# Patient Record
Sex: FEMALE | URBAN - METROPOLITAN AREA
[De-identification: names, ages, dates, MRNs, and addresses within clinical notes are randomized per-mention and may not be internally consistent; named-entity substitution may affect disease eponyms.]

---

## 2022-01-14 ENCOUNTER — OFFICE VISIT (OUTPATIENT)
Dept: URBAN - METROPOLITAN AREA TELEHEALTH 2 | Facility: TELEHEALTH | Age: 47
End: 2022-01-14
Payer: COMMERCIAL

## 2022-01-14 DIAGNOSIS — K21.9 GERD WITHOUT ESOPHAGITIS: ICD-10-CM

## 2022-01-14 DIAGNOSIS — R10.32 LLQ ABDOMINAL PAIN: ICD-10-CM

## 2022-01-14 DIAGNOSIS — C50.111 MALIGNANT NEOPLASM OF CENTRAL PORTION OF RIGHT FEMALE BREAST: ICD-10-CM

## 2022-01-14 DIAGNOSIS — Z17.1 ESTROGEN RECEPTOR NEGATIVE STATUS [ER-]: ICD-10-CM

## 2022-01-14 PROCEDURE — 99214 OFFICE O/P EST MOD 30 MIN: CPT | Performed by: INTERNAL MEDICINE

## 2022-01-14 RX ORDER — DICYCLOMINE HYDROCHLORIDE 20 MG/1
1 TABLET TABLET ORAL THREE TIMES A DAY
Qty: 42 TABLET | Refills: 1 | OUTPATIENT
Start: 2022-01-14 | End: 2022-02-11

## 2022-01-14 NOTE — HPI-TODAY'S VISIT:
This is a telehealth OV to which patient has agreed to. Limitations of telehealth discussed. Patient's @ home during this virtual OV. 45 yo pt w intermittent LLQ abdominal pain. wo radiation, crmping - dull, tender to touch when it occurs, lasting 3 days; unknown triggers. No obstipation, fever, chills, nor urologic sxs associated w  her abdominal pain. She had COVID-19 3 weeks ago: arthralgias / myalgias / cough / headache / fatigue, the latter has been persistent since. Currently, no respiratory sxs and no fever or chills. She had  a recent normal GYN evaluation and normal Pelvic CT scan.  EGD 1/20: NERD, HH, chronic Hp-negative gastritis and normal duodenal bx's. Colonoscopy 1/20: normal. She will need to have surgical revision of breast implants.  She's fully COVID-19 vaccinated. No other complaints.

## 2022-05-25 ENCOUNTER — OFFICE VISIT (OUTPATIENT)
Dept: URBAN - METROPOLITAN AREA CLINIC 98 | Facility: CLINIC | Age: 47
End: 2022-05-25
Payer: COMMERCIAL

## 2022-05-25 VITALS
WEIGHT: 198 LBS | TEMPERATURE: 97.3 F | HEART RATE: 83 BPM | DIASTOLIC BLOOD PRESSURE: 65 MMHG | HEIGHT: 61 IN | SYSTOLIC BLOOD PRESSURE: 100 MMHG | BODY MASS INDEX: 37.38 KG/M2

## 2022-05-25 DIAGNOSIS — C50.111 MALIGNANT NEOPLASM OF CENTRAL PORTION OF RIGHT FEMALE BREAST: ICD-10-CM

## 2022-05-25 DIAGNOSIS — K21.9 GERD WITHOUT ESOPHAGITIS: ICD-10-CM

## 2022-05-25 DIAGNOSIS — Z17.1 ESTROGEN RECEPTOR NEGATIVE STATUS [ER-]: ICD-10-CM

## 2022-05-25 DIAGNOSIS — R10.32 LLQ ABDOMINAL PAIN: ICD-10-CM

## 2022-05-25 PROCEDURE — 99214 OFFICE O/P EST MOD 30 MIN: CPT | Performed by: INTERNAL MEDICINE

## 2022-05-25 NOTE — HPI-TODAY'S VISIT:
45 yo pt for abdominal pain follow up. Feeling better w diet changes and Dicyclomine. Has placed herself on a lactose-free diet w good response.  No obstipation, fever, chills, nor urologic sxs associated w  her abdominal pain.  Currently, no respiratory sxs and no fever or chills. She had  a recent normal GYN evaluation and normal Pelvic CT scan.  EGD 1/20: NERD, HH, chronic Hp-negative gastritis and normal duodenal bx's. Colonoscopy 1/20: normal. She will need to have surgical revision of breast implants.  She had COVID-19 pneuminitis and she's fully COVID-19 vaccinated. No other complaints.

## 2022-10-17 ENCOUNTER — OFFICE VISIT (OUTPATIENT)
Dept: URBAN - METROPOLITAN AREA TELEHEALTH 2 | Facility: TELEHEALTH | Age: 47
End: 2022-10-17
Payer: COMMERCIAL

## 2022-10-17 DIAGNOSIS — Z17.1 ESTROGEN RECEPTOR NEGATIVE STATUS [ER-]: ICD-10-CM

## 2022-10-17 DIAGNOSIS — C50.111 MALIGNANT NEOPLASM OF CENTRAL PORTION OF RIGHT FEMALE BREAST: ICD-10-CM

## 2022-10-17 DIAGNOSIS — K21.9 GERD WITHOUT ESOPHAGITIS: ICD-10-CM

## 2022-10-17 DIAGNOSIS — R10.12 LUQ ABDOMINAL PAIN: ICD-10-CM

## 2022-10-17 PROBLEM — 188151006: Status: ACTIVE | Noted: 2022-01-14

## 2022-10-17 PROBLEM — 266435005: Status: ACTIVE | Noted: 2022-01-14

## 2022-10-17 PROCEDURE — 99214 OFFICE O/P EST MOD 30 MIN: CPT | Performed by: INTERNAL MEDICINE

## 2022-10-17 NOTE — HPI-TODAY'S VISIT:
This is a Telehealth OV to which patient  has agreed to. Limitations of telehealth discussed. Patient understands and agrees to proceed.  Patient's @ home during this virtual OV. 48 yo pt for abdominal pain follow up. Last week: L-sided abdominal pain: LUQ w radiation acroos the upper abdomen and LUQ + back. Rather constant. Unrelated to eating or meals. Exacerbated by L-;ateral decubitus. Has normal bm's wo diarrhea nor constipation and no melenic stools nor bleeding. No urologic sxs. No N/V and denies cardiorespiratory sxs nor fever, chills or night swetas. Aleve helps. Had a normal evaluation w Dr. Mathew recently, including labs. Has placed herself on a lactose-free diet. .  Currently, no respiratory sxs and no fever or chills. She had  a recent normal GYN evaluation and normal Pelvic CT scan.  EGD 1/20: NERD, HH, chronic Hp-negative gastritis and normal duodenal bx's. Colonoscopy 1/20: normal.  She had COVID-19 pneuminitis and she's fully COVID-19 vaccinated. No other complaints.

## 2022-10-19 ENCOUNTER — TELEPHONE ENCOUNTER (OUTPATIENT)
Dept: URBAN - METROPOLITAN AREA CLINIC 98 | Facility: CLINIC | Age: 47
End: 2022-10-19

## 2022-11-01 ENCOUNTER — LAB OUTSIDE AN ENCOUNTER (OUTPATIENT)
Dept: URBAN - METROPOLITAN AREA CLINIC 98 | Facility: CLINIC | Age: 47
End: 2022-11-01

## 2022-11-01 ENCOUNTER — OFFICE VISIT (OUTPATIENT)
Dept: URBAN - METROPOLITAN AREA CLINIC 98 | Facility: CLINIC | Age: 47
End: 2022-11-01
Payer: COMMERCIAL

## 2022-11-01 VITALS
DIASTOLIC BLOOD PRESSURE: 93 MMHG | TEMPERATURE: 97 F | WEIGHT: 207.4 LBS | HEIGHT: 61 IN | HEART RATE: 93 BPM | SYSTOLIC BLOOD PRESSURE: 131 MMHG | BODY MASS INDEX: 39.16 KG/M2

## 2022-11-01 DIAGNOSIS — K62.5 RECTAL BLEEDING: ICD-10-CM

## 2022-11-01 DIAGNOSIS — E66.9 OBESITY (BMI 30.0-34.9): ICD-10-CM

## 2022-11-01 DIAGNOSIS — R10.12 LUQ ABDOMINAL PAIN: ICD-10-CM

## 2022-11-01 LAB
ABSOLUTE NRBC COUNT: 0
AG RATIO: 1
ALBUMIN LEVEL: 4.4
ALK PHOS: 68
ALT: 20
ANION GAP: 9
AST: 21
BILIRUBIN TOTAL: 0.6
BUN/CREAT RATIO: 11
BUN: 8
CALCIUM LEVEL: 9.7
CHLORIDE LEVEL: 104
CO2 LEVEL: 26
CREATININE LEVEL: 0.7
CRP: 0.37
FERRITIN LEVEL: 5.8
GFR 2021: >60
GLUCOSE LEVEL: 80
HCT: 37.8
HGB: 11.9
LIPASE LEVEL: 20
MCH: 27.4
MCHC: 31.5
MCV: 86.9
MPV: 10.5
NRBC AUTO: 0
OSMO (CALC): 275
PERFORMING LAB: (no result)
PLATELETS: 400
POTASSIUM LEVEL: 4
PROTEIN TOTAL: 7.8
RBC: 4.35
RDW: 15.4
SODIUM LEVEL: 139
WBC: 8.1

## 2022-11-01 PROCEDURE — 99214 OFFICE O/P EST MOD 30 MIN: CPT | Performed by: INTERNAL MEDICINE

## 2022-11-01 RX ORDER — SODIUM, POTASSIUM,MAG SULFATES 17.5-3.13G
354ML SOLUTION, RECONSTITUTED, ORAL ORAL
Qty: 345 MILLILITER | Refills: 0 | OUTPATIENT
Start: 2022-11-01 | End: 2022-11-02

## 2022-11-01 NOTE — PHYSICAL EXAM GASTROINTESTINAL
Abdomen , soft, tender in L-flank w some guarding, tympanitic, nondistended , no guarding or rigidity , no masses palpable , normal bowel sounds , Liver and Spleen, no hepatosplenomegaly , liver nontender

## 2022-11-01 NOTE — HPI-TODAY'S VISIT:
48 yo pt for abdominal pain follow up. Last week: L-sided abdominal pain: LUQ w radiation across the upper abdomen and LUQ + back, unrelated to eating or meals, less w fasting. Placed herself on a liquid - bland diet. Had rectal bleeding x 6 days. Some weight loss due to decreased po intake.  No constipation and no melenic stools.  No urologic sxs. Has been on prn Naproxen, none x couple of days.  No N/V and denies cardiorespiratory sxs nor fever, chills or night swetas.  Had a normal evaluation w Dr. Mathew recently, including labs.  Currently, no respiratory sxs and no fever or chills. She had  a recent normal GYN evaluation and normal Pelvic CT scan. A + P CT 10/22: normal x for fibroid uterus.  EGD 1/20: NERD, HH, chronic Hp-negative gastritis and normal duodenal bx's. Colonoscopy 1/20: normal.  She had COVID-19 pneumonitis and she's fully COVID-19 vaccinated. No other complaints.

## 2022-11-02 ENCOUNTER — TELEPHONE ENCOUNTER (OUTPATIENT)
Dept: URBAN - METROPOLITAN AREA CLINIC 98 | Facility: CLINIC | Age: 47
End: 2022-11-02

## 2022-11-02 PROBLEM — 443371000124107: Status: ACTIVE | Noted: 2022-11-02

## 2022-11-02 LAB
IRON LEVEL: 35
IRON SAT: 9
PERFORMING LAB: (no result)
TIBC: 408

## 2022-11-11 ENCOUNTER — DASHBOARD ENCOUNTERS (OUTPATIENT)
Age: 47
End: 2022-11-11

## 2022-11-11 ENCOUNTER — OFFICE VISIT (OUTPATIENT)
Dept: URBAN - METROPOLITAN AREA TELEHEALTH 2 | Facility: TELEHEALTH | Age: 47
End: 2022-11-11
Payer: COMMERCIAL

## 2022-11-11 DIAGNOSIS — D50.0 IRON DEFICIENCY ANEMIA DUE TO CHRONIC BLOOD LOSS: ICD-10-CM

## 2022-11-11 PROBLEM — 724556004: Status: ACTIVE | Noted: 2022-11-02

## 2022-11-11 PROCEDURE — 99214 OFFICE O/P EST MOD 30 MIN: CPT | Performed by: INTERNAL MEDICINE

## 2022-11-11 RX ORDER — FERROUS SULFATE 325(65) MG
1 TABLET TABLET ORAL ONCE A DAY
Status: ACTIVE | COMMUNITY

## 2022-11-11 RX ORDER — MULTIVIT-MIN/IRON/FOLIC ACID/K 18-600-40
AS DIRECTED CAPSULE ORAL
Status: ACTIVE | COMMUNITY

## 2022-11-11 RX ORDER — CHOLECALCIFEROL (VITAMIN D3) 10 MCG
1 CAPSULE CAPSULE ORAL ONCE A DAY
Status: ACTIVE | COMMUNITY

## 2023-01-23 ENCOUNTER — CLAIMS CREATED FROM THE CLAIM WINDOW (OUTPATIENT)
Dept: URBAN - METROPOLITAN AREA SURGERY CENTER 18 | Facility: SURGERY CENTER | Age: 48
End: 2023-01-23

## 2023-01-23 ENCOUNTER — CLAIMS CREATED FROM THE CLAIM WINDOW (OUTPATIENT)
Dept: URBAN - METROPOLITAN AREA SURGERY CENTER 18 | Facility: SURGERY CENTER | Age: 48
End: 2023-01-23
Payer: COMMERCIAL

## 2023-01-23 ENCOUNTER — CLAIMS CREATED FROM THE CLAIM WINDOW (OUTPATIENT)
Dept: URBAN - METROPOLITAN AREA CLINIC 4 | Facility: CLINIC | Age: 48
End: 2023-01-23
Payer: COMMERCIAL

## 2023-01-23 DIAGNOSIS — K29.60 ADENOPAPILLOMATOSIS GASTRICA: ICD-10-CM

## 2023-01-23 DIAGNOSIS — D50.9 ANEMIA: ICD-10-CM

## 2023-01-23 DIAGNOSIS — K31.89 OTHER DISEASES OF STOMACH AND DUODENUM: ICD-10-CM

## 2023-01-23 PROCEDURE — 43239 EGD BIOPSY SINGLE/MULTIPLE: CPT | Performed by: INTERNAL MEDICINE

## 2023-01-23 PROCEDURE — G8907 PT DOC NO EVENTS ON DISCHARG: HCPCS | Performed by: INTERNAL MEDICINE

## 2023-01-23 PROCEDURE — 88305 TISSUE EXAM BY PATHOLOGIST: CPT | Performed by: PATHOLOGY

## 2023-01-23 PROCEDURE — 88342 IMHCHEM/IMCYTCHM 1ST ANTB: CPT | Performed by: PATHOLOGY

## 2023-01-23 PROCEDURE — 45378 DIAGNOSTIC COLONOSCOPY: CPT | Performed by: INTERNAL MEDICINE

## 2023-01-23 RX ORDER — MULTIVIT-MIN/IRON/FOLIC ACID/K 18-600-40
AS DIRECTED CAPSULE ORAL
Status: ACTIVE | COMMUNITY

## 2023-01-23 RX ORDER — CHOLECALCIFEROL (VITAMIN D3) 10 MCG
1 CAPSULE CAPSULE ORAL ONCE A DAY
Status: ACTIVE | COMMUNITY

## 2023-01-23 RX ORDER — FERROUS SULFATE 325(65) MG
1 TABLET TABLET ORAL ONCE A DAY
Status: ACTIVE | COMMUNITY